# Patient Record
Sex: FEMALE | Employment: FULL TIME | URBAN - METROPOLITAN AREA
[De-identification: names, ages, dates, MRNs, and addresses within clinical notes are randomized per-mention and may not be internally consistent; named-entity substitution may affect disease eponyms.]

---

## 2017-07-07 ENCOUNTER — HOSPITAL ENCOUNTER (OUTPATIENT)
Dept: RADIATION THERAPY | Age: 32
Discharge: HOME | End: 2017-07-07
Payer: COMMERCIAL

## 2017-07-07 PROCEDURE — 99211 OFF/OP EST MAY X REQ PHY/QHP: CPT

## 2017-07-10 ENCOUNTER — HOSPITAL ENCOUNTER (OUTPATIENT)
Dept: RADIATION THERAPY | Age: 32
Discharge: HOME | End: 2017-07-10
Payer: COMMERCIAL

## 2017-07-10 PROCEDURE — 77300 RADIATION THERAPY DOSE PLAN: CPT

## 2017-07-10 PROCEDURE — 77290 THER RAD SIMULAJ FIELD CPLX: CPT

## 2017-07-10 PROCEDURE — 77412 RADIATION TX DELIVERY LVL 3: CPT

## 2017-07-10 PROCEDURE — 77334 RADIATION TREATMENT AID(S): CPT

## 2017-07-11 ENCOUNTER — HOSPITAL ENCOUNTER (OUTPATIENT)
Dept: RADIATION THERAPY | Age: 32
Discharge: HOME | End: 2017-07-11
Payer: COMMERCIAL

## 2017-07-11 PROCEDURE — 77412 RADIATION TX DELIVERY LVL 3: CPT

## 2017-07-12 ENCOUNTER — HOSPITAL ENCOUNTER (OUTPATIENT)
Dept: RADIATION THERAPY | Age: 32
Discharge: HOME | End: 2017-07-12
Payer: COMMERCIAL

## 2017-07-12 PROCEDURE — 77412 RADIATION TX DELIVERY LVL 3: CPT

## 2024-09-24 PROBLEM — O99.013 ANEMIA COMPLICATING PREGNANCY IN THIRD TRIMESTER: Status: ACTIVE | Noted: 2024-09-24

## 2024-09-24 RX ORDER — ACETAMINOPHEN 325 MG/1
650 TABLET ORAL
Status: CANCELLED | OUTPATIENT
Start: 2024-10-01

## 2024-09-24 RX ORDER — ALBUTEROL SULFATE 90 UG/1
4 INHALANT RESPIRATORY (INHALATION) PRN
Status: CANCELLED | OUTPATIENT
Start: 2024-10-01

## 2024-09-24 RX ORDER — SODIUM CHLORIDE 9 MG/ML
5-250 INJECTION, SOLUTION INTRAVENOUS PRN
Status: CANCELLED | OUTPATIENT
Start: 2024-10-01

## 2024-09-24 RX ORDER — HEPARIN 100 UNIT/ML
500 SYRINGE INTRAVENOUS PRN
Status: CANCELLED | OUTPATIENT
Start: 2024-10-01

## 2024-09-24 RX ORDER — ONDANSETRON 2 MG/ML
8 INJECTION INTRAMUSCULAR; INTRAVENOUS
Status: CANCELLED | OUTPATIENT
Start: 2024-10-01

## 2024-09-24 RX ORDER — DIPHENHYDRAMINE HYDROCHLORIDE 50 MG/ML
50 INJECTION INTRAMUSCULAR; INTRAVENOUS
Status: CANCELLED | OUTPATIENT
Start: 2024-10-01

## 2024-09-24 RX ORDER — SODIUM CHLORIDE 9 MG/ML
INJECTION, SOLUTION INTRAVENOUS CONTINUOUS
Status: CANCELLED | OUTPATIENT
Start: 2024-10-01

## 2024-09-24 RX ORDER — EPINEPHRINE 1 MG/ML
0.3 INJECTION, SOLUTION, CONCENTRATE INTRAVENOUS PRN
Status: CANCELLED | OUTPATIENT
Start: 2024-10-01

## 2024-09-30 ENCOUNTER — HOSPITAL ENCOUNTER (OUTPATIENT)
Facility: HOSPITAL | Age: 39
Setting detail: INFUSION SERIES
Discharge: HOME | End: 2024-10-03
Payer: COMMERCIAL

## 2024-09-30 VITALS
RESPIRATION RATE: 16 BRPM | DIASTOLIC BLOOD PRESSURE: 77 MMHG | SYSTOLIC BLOOD PRESSURE: 126 MMHG | HEART RATE: 83 BPM | OXYGEN SATURATION: 97 % | TEMPERATURE: 98.2 F

## 2024-09-30 DIAGNOSIS — O99.013 ANEMIA COMPLICATING PREGNANCY IN THIRD TRIMESTER: Primary | ICD-10-CM

## 2024-09-30 PROCEDURE — 2580000003 HC RX 258: Performed by: ADVANCED PRACTICE MIDWIFE

## 2024-09-30 PROCEDURE — 6360000002 HC RX W HCPCS: Performed by: ADVANCED PRACTICE MIDWIFE

## 2024-09-30 PROCEDURE — 96365 THER/PROPH/DIAG IV INF INIT: CPT

## 2024-09-30 RX ORDER — EPINEPHRINE 1 MG/ML
0.3 INJECTION, SOLUTION, CONCENTRATE INTRAVENOUS PRN
Status: CANCELLED | OUTPATIENT
Start: 2024-10-01

## 2024-09-30 RX ORDER — SODIUM CHLORIDE 9 MG/ML
INJECTION, SOLUTION INTRAVENOUS CONTINUOUS
Status: CANCELLED | OUTPATIENT
Start: 2024-10-01

## 2024-09-30 RX ORDER — SODIUM CHLORIDE 9 MG/ML
5-250 INJECTION, SOLUTION INTRAVENOUS PRN
Status: ACTIVE | OUTPATIENT
Start: 2024-09-30 | End: 2024-10-01

## 2024-09-30 RX ORDER — ACETAMINOPHEN 325 MG/1
650 TABLET ORAL
Status: CANCELLED | OUTPATIENT
Start: 2024-10-01

## 2024-09-30 RX ORDER — ALBUTEROL SULFATE 90 UG/1
4 INHALANT RESPIRATORY (INHALATION) PRN
Status: CANCELLED | OUTPATIENT
Start: 2024-10-01

## 2024-09-30 RX ORDER — ONDANSETRON 2 MG/ML
8 INJECTION INTRAMUSCULAR; INTRAVENOUS
Status: CANCELLED | OUTPATIENT
Start: 2024-10-01

## 2024-09-30 RX ORDER — HEPARIN 100 UNIT/ML
500 SYRINGE INTRAVENOUS PRN
Status: CANCELLED | OUTPATIENT
Start: 2024-10-01

## 2024-09-30 RX ORDER — DIPHENHYDRAMINE HYDROCHLORIDE 50 MG/ML
50 INJECTION INTRAMUSCULAR; INTRAVENOUS
Status: CANCELLED | OUTPATIENT
Start: 2024-10-01

## 2024-09-30 RX ORDER — DOCUSATE SODIUM 100 MG/1
100 CAPSULE, LIQUID FILLED ORAL 2 TIMES DAILY PRN
COMMUNITY

## 2024-09-30 RX ORDER — SODIUM CHLORIDE 9 MG/ML
5-250 INJECTION, SOLUTION INTRAVENOUS PRN
Status: CANCELLED | OUTPATIENT
Start: 2024-10-01

## 2024-09-30 RX ORDER — SODIUM CHLORIDE 0.9 % (FLUSH) 0.9 %
5-40 SYRINGE (ML) INJECTION PRN
Status: ACTIVE | OUTPATIENT
Start: 2024-09-30 | End: 2024-10-01

## 2024-09-30 RX ORDER — SODIUM CHLORIDE 0.9 % (FLUSH) 0.9 %
5-40 SYRINGE (ML) INJECTION PRN
Status: CANCELLED | OUTPATIENT
Start: 2024-10-01

## 2024-09-30 RX ADMIN — SODIUM CHLORIDE 100 MG: 9 INJECTION, SOLUTION INTRAVENOUS at 13:44

## 2024-09-30 RX ADMIN — SODIUM CHLORIDE 30 ML/HR: 9 INJECTION, SOLUTION INTRAVENOUS at 13:36

## 2024-09-30 RX ADMIN — SODIUM CHLORIDE, PRESERVATIVE FREE 10 ML: 5 INJECTION INTRAVENOUS at 13:30

## 2024-09-30 ASSESSMENT — PAIN - FUNCTIONAL ASSESSMENT
PAIN_FUNCTIONAL_ASSESSMENT: NONE - DENIES PAIN

## 2024-09-30 NOTE — PROGRESS NOTES
[facility] Progress Note    Date: 2024    Name: Bebe Quiroz    MRN: [id number]         : 1985    Ms. Quiroz arrived in the [facility] today at 1310, in stable condition and accompanied by her sister, here for DOSE # 1 OF 3, IV VENOFER INFUSION. She was assessed and education was provided.       VENOFER 100 MG HAS BEEN ORDERED FOR HER TO RECEIVE 3 TIMES IN ONE WEEK.       Ms. Quiroz's vitals were reviewed.  Vitals:    24 1310   BP: 119/74   Pulse: 85   Resp: 16   Temp: 98.3 °F (36.8 °C)   SpO2: 95%         Ms. Quiroz presented to the infusion center today stating that she was doing well, and voicing no major complaints or concerns.     She stated that she is currently 36.5 weeks pregnant.       Both verbal and written patient education on the IV VENOFER infusion and associated potential side effects and adverse reactions was provided, and she verbalized understanding.           PIV # 22 G was established in her LEFT AC at 1330 without incident, and brisk blood return was obtained.        ml IV BAG was initiated to infuse @ KVO throughout treatment today.       Iron Sucrose (VENOFER) 100 mg IV, was administered per order, over approximately 15 minutes, without incident.         After completion of the VENOFER infusion, Ms. Quiroz was kept for approximately 30 minutes of observation, just as a precaution. The NS was infused @ 200 ml/hr throughout the observation period.       After completion of the observation period, her PIV was removed and gauze//coban was applied.         Ms. Quiroz tolerated treatment very well today, and she voiced no complaints.     [facility]. Quiro[facility] in stable condition at 1450.     She is to return on Wednesday, 10-2-24 at 1300, for her next appointment, for Dose # 2 of 3, IV VENOFER infusion.     Radhika Mon RN  2024  1:25 PM

## 2024-10-02 ENCOUNTER — HOSPITAL ENCOUNTER (OUTPATIENT)
Facility: HOSPITAL | Age: 39
Setting detail: INFUSION SERIES
Discharge: HOME | End: 2024-10-05
Payer: COMMERCIAL

## 2024-10-02 VITALS
HEART RATE: 92 BPM | TEMPERATURE: 98.3 F | RESPIRATION RATE: 16 BRPM | OXYGEN SATURATION: 100 % | DIASTOLIC BLOOD PRESSURE: 79 MMHG | SYSTOLIC BLOOD PRESSURE: 125 MMHG

## 2024-10-02 DIAGNOSIS — O99.013 ANEMIA COMPLICATING PREGNANCY IN THIRD TRIMESTER: Primary | ICD-10-CM

## 2024-10-02 PROCEDURE — 6360000002 HC RX W HCPCS: Performed by: ADVANCED PRACTICE MIDWIFE

## 2024-10-02 PROCEDURE — 2580000003 HC RX 258: Performed by: ADVANCED PRACTICE MIDWIFE

## 2024-10-02 PROCEDURE — 96365 THER/PROPH/DIAG IV INF INIT: CPT

## 2024-10-02 RX ORDER — SODIUM CHLORIDE 9 MG/ML
5-250 INJECTION, SOLUTION INTRAVENOUS PRN
Status: ACTIVE | OUTPATIENT
Start: 2024-10-02 | End: 2024-10-03

## 2024-10-02 RX ORDER — HEPARIN 100 UNIT/ML
500 SYRINGE INTRAVENOUS PRN
Status: CANCELLED | OUTPATIENT
Start: 2024-10-03

## 2024-10-02 RX ORDER — SODIUM CHLORIDE 9 MG/ML
5-250 INJECTION, SOLUTION INTRAVENOUS PRN
Status: CANCELLED | OUTPATIENT
Start: 2024-10-03

## 2024-10-02 RX ORDER — EPINEPHRINE 1 MG/ML
0.3 INJECTION, SOLUTION, CONCENTRATE INTRAVENOUS PRN
Status: CANCELLED | OUTPATIENT
Start: 2024-10-03

## 2024-10-02 RX ORDER — SODIUM CHLORIDE 0.9 % (FLUSH) 0.9 %
5-40 SYRINGE (ML) INJECTION PRN
Status: ACTIVE | OUTPATIENT
Start: 2024-10-02 | End: 2024-10-03

## 2024-10-02 RX ORDER — SODIUM CHLORIDE 9 MG/ML
INJECTION, SOLUTION INTRAVENOUS CONTINUOUS
Status: CANCELLED | OUTPATIENT
Start: 2024-10-03

## 2024-10-02 RX ORDER — DIPHENHYDRAMINE HYDROCHLORIDE 50 MG/ML
50 INJECTION INTRAMUSCULAR; INTRAVENOUS
Status: CANCELLED | OUTPATIENT
Start: 2024-10-03

## 2024-10-02 RX ORDER — ACETAMINOPHEN 325 MG/1
650 TABLET ORAL
Status: CANCELLED | OUTPATIENT
Start: 2024-10-03

## 2024-10-02 RX ORDER — ONDANSETRON 2 MG/ML
8 INJECTION INTRAMUSCULAR; INTRAVENOUS
Status: CANCELLED | OUTPATIENT
Start: 2024-10-03

## 2024-10-02 RX ORDER — SODIUM CHLORIDE 0.9 % (FLUSH) 0.9 %
5-40 SYRINGE (ML) INJECTION PRN
Status: CANCELLED | OUTPATIENT
Start: 2024-10-03

## 2024-10-02 RX ORDER — ALBUTEROL SULFATE 90 UG/1
4 INHALANT RESPIRATORY (INHALATION) PRN
Status: CANCELLED | OUTPATIENT
Start: 2024-10-03

## 2024-10-02 RX ADMIN — SODIUM CHLORIDE 100 MG: 9 INJECTION, SOLUTION INTRAVENOUS at 13:29

## 2024-10-02 RX ADMIN — SODIUM CHLORIDE 25 ML/HR: 9 INJECTION, SOLUTION INTRAVENOUS at 13:26

## 2024-10-02 RX ADMIN — SODIUM CHLORIDE, PRESERVATIVE FREE 10 ML: 5 INJECTION INTRAVENOUS at 13:23

## 2024-10-02 NOTE — PROGRESS NOTES
[facility] Progress Note    Date: 2024    Name: Bebe Quiroz    MRN: [id number]         : 1985    VENOFER INFUSION 2 of 3    Ms. Quiroz arrived in the [facility] today at 1310, in stable condition. She was assessed and education was provided. Pt stated she tolerated first dose of venofer well.    Ms. Quiroz's vitals were reviewed.  Vitals:    10/02/24 1308   BP: 116/65   Pulse: 87   Resp: 16   Temp: 97.9 °F (36.6 °C)   SpO2: 100%     PIV # 24 G was established in her LEFT wrist, brisk blood return was obtained.      ml IV BAG was initiated to infuse @ KVO throughout treatment today.     Iron Sucrose (VENOFER) 100 mg IV was administered over 17 minutes.     After completion of the VENOFER infusion, Ms. Quiroz was kept for approximately 30 minutes of observation. NS was infused @ 200 ml/hr throughout the observation period.     After completion of the observation period, her PIV was removed and gauze//coban was applied.     Ms. Quiroz tolerated treatment very well today, and she voiced no complaints.     Patient Vitals for the past 12 hrs:   Temp Pulse Resp BP SpO2   10/02/24 1416 98.3 °F (36.8 °C) 92 16 125/79 100 %   10/02/24 1346 98 °F (36.7 °C) 88 16 113/77 99 %   10/02/24 1308 97.9 °F (36.6 °C) 87 16 116/65 100 %     Ms. Quiroz was [facility] in stable condition at 1420.     She is to return 10/4/24 at 1400 for her next appointment for VENOFER 3 of 3.     Jesuista Amos RN  2024  1:43 PM

## 2024-10-04 ENCOUNTER — HOSPITAL ENCOUNTER (OUTPATIENT)
Facility: HOSPITAL | Age: 39
Setting detail: INFUSION SERIES
Discharge: HOME | End: 2024-10-07
Payer: COMMERCIAL

## 2024-10-04 VITALS
SYSTOLIC BLOOD PRESSURE: 107 MMHG | HEART RATE: 95 BPM | RESPIRATION RATE: 16 BRPM | TEMPERATURE: 98.3 F | OXYGEN SATURATION: 98 % | DIASTOLIC BLOOD PRESSURE: 71 MMHG

## 2024-10-04 DIAGNOSIS — O99.013 ANEMIA COMPLICATING PREGNANCY IN THIRD TRIMESTER: Primary | ICD-10-CM

## 2024-10-04 PROCEDURE — 6360000002 HC RX W HCPCS: Performed by: ADVANCED PRACTICE MIDWIFE

## 2024-10-04 PROCEDURE — 2580000003 HC RX 258: Performed by: ADVANCED PRACTICE MIDWIFE

## 2024-10-04 PROCEDURE — 96365 THER/PROPH/DIAG IV INF INIT: CPT

## 2024-10-04 RX ORDER — ACETAMINOPHEN 325 MG/1
650 TABLET ORAL
OUTPATIENT
Start: 2024-10-04

## 2024-10-04 RX ORDER — HEPARIN 100 UNIT/ML
500 SYRINGE INTRAVENOUS PRN
OUTPATIENT
Start: 2024-10-04

## 2024-10-04 RX ORDER — SODIUM CHLORIDE 9 MG/ML
INJECTION, SOLUTION INTRAVENOUS CONTINUOUS
OUTPATIENT
Start: 2024-10-04

## 2024-10-04 RX ORDER — EPINEPHRINE 1 MG/ML
0.3 INJECTION, SOLUTION, CONCENTRATE INTRAVENOUS PRN
OUTPATIENT
Start: 2024-10-04

## 2024-10-04 RX ORDER — ALBUTEROL SULFATE 90 UG/1
4 INHALANT RESPIRATORY (INHALATION) PRN
OUTPATIENT
Start: 2024-10-04

## 2024-10-04 RX ORDER — ONDANSETRON 2 MG/ML
8 INJECTION INTRAMUSCULAR; INTRAVENOUS
OUTPATIENT
Start: 2024-10-04

## 2024-10-04 RX ORDER — SODIUM CHLORIDE 9 MG/ML
5-250 INJECTION, SOLUTION INTRAVENOUS PRN
OUTPATIENT
Start: 2024-10-04

## 2024-10-04 RX ORDER — SODIUM CHLORIDE 0.9 % (FLUSH) 0.9 %
5-40 SYRINGE (ML) INJECTION PRN
Status: ACTIVE | OUTPATIENT
Start: 2024-10-04 | End: 2024-10-05

## 2024-10-04 RX ORDER — DIPHENHYDRAMINE HYDROCHLORIDE 50 MG/ML
50 INJECTION INTRAMUSCULAR; INTRAVENOUS
OUTPATIENT
Start: 2024-10-04

## 2024-10-04 RX ORDER — SODIUM CHLORIDE 0.9 % (FLUSH) 0.9 %
5-40 SYRINGE (ML) INJECTION PRN
OUTPATIENT
Start: 2024-10-04

## 2024-10-04 RX ADMIN — SODIUM CHLORIDE, PRESERVATIVE FREE 30 ML: 5 INJECTION INTRAVENOUS at 14:42

## 2024-10-04 RX ADMIN — SODIUM CHLORIDE, PRESERVATIVE FREE 10 ML: 5 INJECTION INTRAVENOUS at 14:15

## 2024-10-04 RX ADMIN — SODIUM CHLORIDE 100 MG: 9 INJECTION, SOLUTION INTRAVENOUS at 14:22

## 2024-10-04 NOTE — PROGRESS NOTES
[facility] Progress Note    Date: 2024    Name: Bebe Quiroz    MRN: [id number]         : 1985    VENOFER INFUSION 3 of 3    Ms. Quiroz arrived in the [facility] today at 1405, in stable condition. She is 37 weeks pregnant. No complaints or concerns voiced.     She was assessed and education was provided. Pt stated she tolerated previous infusions well.    Ms. Quiroz's vitals were reviewed.  Vitals:    10/04/24 1515   BP: 107/71   Pulse: 95   Resp: 16   Temp: 98.3 °F (36.8 °C)   SpO2: 98%     PIV # 22 G was established in her RT AC x 1 attempt. Brisk flush/ blood return noted.        Iron Sucrose (VENOFER) 100 mg IV was administered over 17 minutes, followed by normal saline flush. No complaints voiced or reaction noted.    After completion of the VENOFER infusion, Ms. Quiroz was kept for approximately 30 minutes of observation, and presented no complications.     PIV was removed. Site without bruising, bleeding, swelling or tenderness. and gauze//coban was applied.     Ms. Quiroz tolerated treatment very well today, and she voiced no complaints.     Patient Vitals for the past 12 hrs:   Temp Pulse Resp BP SpO2   10/04/24 1515 98.3 °F (36.8 °C) 95 16 107/71 98 %   10/04/24 1430 98.6 °F (37 °C) 94 16 108/67 97 %   10/04/24 1407 98.8 °F (37.1 °C) 100 18 116/81 97 %     Reviewed discharge instructions with patient. She stated understanding.    Armband removed and shredded.    Ms. Quiroz was [facility] in stable condition at 1520    She has no further appointment with [facility] [place].    EROS MOTLEY RN  2024  3:48 PM